# Patient Record
Sex: FEMALE
[De-identification: names, ages, dates, MRNs, and addresses within clinical notes are randomized per-mention and may not be internally consistent; named-entity substitution may affect disease eponyms.]

---

## 2021-01-13 ENCOUNTER — APPOINTMENT (OUTPATIENT)
Dept: ENDOCRINOLOGY | Facility: CLINIC | Age: 36
End: 2021-01-13
Payer: COMMERCIAL

## 2021-01-13 VITALS
BODY MASS INDEX: 21.05 KG/M2 | DIASTOLIC BLOOD PRESSURE: 72 MMHG | HEIGHT: 66 IN | SYSTOLIC BLOOD PRESSURE: 131 MMHG | HEART RATE: 79 BPM | WEIGHT: 131 LBS

## 2021-01-13 DIAGNOSIS — Z78.9 OTHER SPECIFIED HEALTH STATUS: ICD-10-CM

## 2021-01-13 PROCEDURE — 99204 OFFICE O/P NEW MOD 45 MIN: CPT

## 2021-01-13 PROCEDURE — 99072 ADDL SUPL MATRL&STAF TM PHE: CPT

## 2021-01-15 PROBLEM — Z78.9 NON-SMOKER: Status: ACTIVE | Noted: 2021-01-15

## 2021-01-15 NOTE — HISTORY OF PRESENT ILLNESS
[FreeTextEntry1] : 34 y/o F w/ Hx of MALICK\par here for initial evaluation and management of hair loss\par generally feels well and endorses no acute complaints.\par reports androgenic pattern hair loss, worst over the last 5 years. no clear trigger, reports acne but denies hirsutism. reports periods are regular, coming every 4 weeks, not on any JOSEPH. no past use. states she has used biotin recently. only med are iron pills for her MALICK, being evaluated by HEmatology. She otherwise denies any f/c, CP, SOB, palpitations, tremors, depressed mood, anxiety, palpitations, n/v, stool/urinary abn, skin/weight changes, heat/cold intolerance, HAs, breast/nipple changes, polyuria/polydipsia/nocturia or other complaints.\par she again denies any dysphagia, hoarseness, neck tenderness or new palpable masses. she again denies any family history of thyroid disorders or personal exposure to ionizing radiation.\par Adrenarche/MEnarche ~ age 11-13, no plans for pregnancy in the near future\par

## 2021-01-15 NOTE — ASSESSMENT
[FreeTextEntry1] : Hair loss\par possible hyperandrogenism given concomitant acne. no hirsutism of note. regular periods and normal weight noted. physiololgy of hair growth reviewed. discussed hair health and avoidance of mechanical strains (reports past braiding). advised on derm eval and rogaine use. r/o endocrinopathies and nutritional/vitamin deficiencies affecting hair growth. Verbalized understanding and agrees with treatment plan, will contact MD and seek emergency medical care if condition changes

## 2021-03-19 DIAGNOSIS — Z00.00 ENCOUNTER FOR GENERAL ADULT MEDICAL EXAMINATION W/OUT ABNORMAL FINDINGS: ICD-10-CM

## 2021-03-29 ENCOUNTER — TRANSCRIPTION ENCOUNTER (OUTPATIENT)
Age: 36
End: 2021-03-29

## 2021-03-31 ENCOUNTER — RESULT REVIEW (OUTPATIENT)
Age: 36
End: 2021-03-31

## 2021-03-31 ENCOUNTER — OUTPATIENT (OUTPATIENT)
Dept: OUTPATIENT SERVICES | Facility: HOSPITAL | Age: 36
LOS: 1 days | End: 2021-03-31

## 2021-03-31 ENCOUNTER — APPOINTMENT (OUTPATIENT)
Dept: MRI IMAGING | Facility: CLINIC | Age: 36
End: 2021-03-31
Payer: COMMERCIAL

## 2021-03-31 PROCEDURE — 70553 MRI BRAIN STEM W/O & W/DYE: CPT | Mod: 26

## 2021-04-14 ENCOUNTER — APPOINTMENT (OUTPATIENT)
Dept: ENDOCRINOLOGY | Facility: CLINIC | Age: 36
End: 2021-04-14
Payer: COMMERCIAL

## 2021-04-14 VITALS
BODY MASS INDEX: 21.31 KG/M2 | DIASTOLIC BLOOD PRESSURE: 70 MMHG | SYSTOLIC BLOOD PRESSURE: 107 MMHG | HEART RATE: 70 BPM | WEIGHT: 132 LBS

## 2021-04-14 PROCEDURE — 99072 ADDL SUPL MATRL&STAF TM PHE: CPT

## 2021-04-14 PROCEDURE — 99215 OFFICE O/P EST HI 40 MIN: CPT

## 2021-04-16 NOTE — HISTORY OF PRESENT ILLNESS
[FreeTextEntry1] : 36 y/o F w/ Hx of MALICK\par initial evaluation and management of hair loss\par generally feels well and endorses no acute complaints.\par reports androgenic pattern hair loss, worst over the last 5 years. no clear trigger, reports acne but denies hirsutism. reports periods are regular, coming every 4 weeks, not on any JOSEPH. no past use. states she has used biotin recently. only med are iron pills for her MALICK, being evaluated by HEmatology. \par \par \par 4/2021 Here for /fu, generally feels well and endorses no acute complaints. No interval events since LV. Today comes for f/u of labs and MRI, which confirm hyperprolactinemia, MRI of the sellar does not show definitive pathology other than mild lateral enlargement w/o a discrete mass or mass effect/displacement.\par She otherwise denies any f/c, CP, SOB, palpitations, tremors, depressed mood, anxiety, palpitations, n/v, stool/urinary abn, skin/weight changes, heat/cold intolerance, HAs, breast/nipple changes, polyuria/polydipsia/nocturia or other complaints.\par she again denies any dysphagia, hoarseness, neck tenderness or new palpable masses. she again denies any family history of thyroid disorders or personal exposure to ionizing radiation.\par Adrenarche/MEnarche ~ age 12-13, no plans for pregnancy in the near future\par

## 2021-04-16 NOTE — ASSESSMENT
[FreeTextEntry1] : Hair loss\par possible hyperandrogenism given concomitant acne. no hirsutism of note. regular periods and normal weight noted. physiololgy of hair growth reviewed. discussed hair health and avoidance of mechanical strains (reports past braiding). advised on derm eval and rogaine use. hyperprolactinemia w/ mild testosterone elevation may be playing a role, proceed w/ DA iniitation ( cabergoline 0.25 mg twice weekly). nutritional/vitamin deficiencies affecting hair growth reviewed. Verbalized understanding and agrees with treatment plan, will contact MD and seek emergency medical care if condition changes

## 2021-06-29 ENCOUNTER — APPOINTMENT (OUTPATIENT)
Dept: ENDOCRINOLOGY | Facility: CLINIC | Age: 36
End: 2021-06-29
Payer: COMMERCIAL

## 2021-06-29 PROCEDURE — 99214 OFFICE O/P EST MOD 30 MIN: CPT | Mod: 95

## 2021-07-01 NOTE — ASSESSMENT
[FreeTextEntry1] : Hair loss\par possible hyperandrogenism given concomitant acne. no hirsutism of note. regular periods and normal weight noted. physiololgy of hair growth reviewed. discussed hair health and avoidance of mechanical strains (reports past braiding). advised on derm eval and rogaine use. hyperprolactinemia w/ mild testosterone elevation may be playing a role, proceed w/ DA titration( cabergoline 0.25 mg twice weekly). \par MRI w/o discrete adenoma or mass effect. no s/s of intracranial mass effect\par nutritional/vitamin deficiencies affecting hair growth reviewed. Verbalized understanding and agrees with treatment plan, will contact MD and seek emergency medical care if condition changes

## 2021-07-01 NOTE — HISTORY OF PRESENT ILLNESS
[Home] : at home, [unfilled] , at the time of the visit. [Medical Office: ([facility])___] : at the medical office located in  [Verbal consent obtained from patient] : the patient, [unfilled] [FreeTextEntry1] : 37 y/o F w/ Hx of MALICK\par initial evaluation and management of hair loss\par generally feels well and endorses no acute complaints.\par reports androgenic pattern hair loss, worst over the last 5 years. no clear trigger, reports acne but denies hirsutism. reports periods are regular, coming every 4 weeks, not on any JOSEPH. no past use. states she has used biotin recently. only med are iron pills for her MALICK, being evaluated by HEmatology. \par \par \par 4/2021 Here for /fu, generally feels well and endorses no acute complaints. No interval events since LV. Today comes for f/u of labs and MRI, which confirm hyperprolactinemia, MRI of the sellar does not show definitive pathology other than mild lateral enlargement w/o a discrete mass or mass effect/displacement.\par She otherwise denies any f/c, CP, SOB, palpitations, tremors, depressed mood, anxiety, palpitations, n/v, stool/urinary abn, skin/weight changes, heat/cold intolerance, HAs, breast/nipple changes, polyuria/polydipsia/nocturia or other complaints.\par she again denies any dysphagia, hoarseness, neck tenderness or new palpable masses. she again denies any family history of thyroid disorders or personal exposure to ionizing radiation.\par Adrenarche/MEnarche ~ age 11-13, no plans for pregnancy in the near future\par

## 2021-10-05 ENCOUNTER — APPOINTMENT (OUTPATIENT)
Dept: ENDOCRINOLOGY | Facility: CLINIC | Age: 36
End: 2021-10-05
Payer: COMMERCIAL

## 2021-10-05 VITALS
HEART RATE: 67 BPM | BODY MASS INDEX: 22.11 KG/M2 | WEIGHT: 137 LBS | DIASTOLIC BLOOD PRESSURE: 64 MMHG | SYSTOLIC BLOOD PRESSURE: 109 MMHG

## 2021-10-05 PROCEDURE — 99214 OFFICE O/P EST MOD 30 MIN: CPT

## 2021-10-06 NOTE — HISTORY OF PRESENT ILLNESS
[FreeTextEntry1] : 37 y/o F w/ Hx of MALICK\par initial evaluation and management of hair loss\par generally feels well and endorses no acute complaints.\par reports androgenic pattern hair loss, worst over the last 5 years. no clear trigger, reports acne but denies hirsutism. reports periods are regular, coming every 4 weeks, not on any JOSEPH. no past use. states she has used biotin recently. only med are iron pills for her MALICK, being evaluated by HEmatology. \par \par \par 10/2021 Here for /fu, generally feels well and endorses no acute complaints. No interval events since LV. Today comes for f/u of labs and MRI, which confirm hyperprolactinemia, MRI of the sellar does not show definitive pathology other than mild lateral enlargement w/o a discrete mass or mass effect/displacement. reports full tolerance and compliance w/ cabergoline as instructed, w/ some partial improvement in hair growth.\par She otherwise denies any f/c, CP, SOB, palpitations, tremors, depressed mood, anxiety, palpitations, n/v, stool/urinary abn, skin/weight changes, heat/cold intolerance, HAs, breast/nipple changes, polyuria/polydipsia/nocturia or other complaints.\par she again denies any dysphagia, hoarseness, neck tenderness or new palpable masses. she again denies any family history of thyroid disorders or personal exposure to ionizing radiation.\par Adrenarche/MEnarche ~ age 12-13, no plans for pregnancy in the near future\par

## 2022-02-02 ENCOUNTER — APPOINTMENT (OUTPATIENT)
Dept: ENDOCRINOLOGY | Facility: CLINIC | Age: 37
End: 2022-02-02
Payer: COMMERCIAL

## 2022-02-02 VITALS
DIASTOLIC BLOOD PRESSURE: 53 MMHG | HEART RATE: 70 BPM | BODY MASS INDEX: 22.02 KG/M2 | WEIGHT: 137 LBS | SYSTOLIC BLOOD PRESSURE: 116 MMHG | HEIGHT: 66 IN

## 2022-02-02 PROCEDURE — 99214 OFFICE O/P EST MOD 30 MIN: CPT

## 2022-02-03 NOTE — HISTORY OF PRESENT ILLNESS
[FreeTextEntry1] : 37 y/o F w/ Hx of MALICK\par initial evaluation and management of hair loss\par generally feels well and endorses no acute complaints.\par reports androgenic pattern hair loss, worst over the last 5 years. no clear trigger, reports acne but denies hirsutism. reports periods are regular, coming every 4 weeks, not on any JOSEPH. no past use. states she has used biotin recently. only med are iron pills for her MALICK, being evaluated by HEmatology. \par \par \par 2/2022 Here for /fu, generally feels well and endorses no acute complaints. No interval events since LV. Today comes for f/u of labs and MRI, which confirm hyperprolactinemia, MRI of the sellar does not show definitive pathology other than mild lateral enlargement w/o a discrete mass or mass effect/displacement. reports full tolerance and compliance w/ cabergoline as instructed, w/ some partial improvement in hair growth.\par She otherwise denies any f/c, CP, SOB, palpitations, tremors, depressed mood, anxiety, palpitations, n/v, stool/urinary abn, skin/weight changes, heat/cold intolerance, HAs, breast/nipple changes, polyuria/polydipsia/nocturia or other complaints.\par she again denies any dysphagia, hoarseness, neck tenderness or new palpable masses. she again denies any family history of thyroid disorders or personal exposure to ionizing radiation.\par Adrenarche/MEnarche ~ age 12-13, no plans for pregnancy in the near future\par

## 2022-12-05 ENCOUNTER — OUTPATIENT (OUTPATIENT)
Dept: OUTPATIENT SERVICES | Facility: HOSPITAL | Age: 37
LOS: 1 days | End: 2022-12-05

## 2022-12-05 ENCOUNTER — RESULT REVIEW (OUTPATIENT)
Age: 37
End: 2022-12-05

## 2022-12-05 ENCOUNTER — APPOINTMENT (OUTPATIENT)
Dept: MRI IMAGING | Facility: CLINIC | Age: 37
End: 2022-12-05

## 2022-12-05 PROCEDURE — 70553 MRI BRAIN STEM W/O & W/DYE: CPT | Mod: 26

## 2022-12-20 ENCOUNTER — APPOINTMENT (OUTPATIENT)
Dept: ENDOCRINOLOGY | Facility: CLINIC | Age: 37
End: 2022-12-20
Payer: COMMERCIAL

## 2022-12-20 VITALS
BODY MASS INDEX: 23.24 KG/M2 | SYSTOLIC BLOOD PRESSURE: 125 MMHG | HEART RATE: 71 BPM | WEIGHT: 144 LBS | DIASTOLIC BLOOD PRESSURE: 76 MMHG

## 2022-12-20 DIAGNOSIS — L65.9 NONSCARRING HAIR LOSS, UNSPECIFIED: ICD-10-CM

## 2022-12-20 DIAGNOSIS — E28.8 OTHER OVARIAN DYSFUNCTION: ICD-10-CM

## 2022-12-20 DIAGNOSIS — E22.1 HYPERPROLACTINEMIA: ICD-10-CM

## 2022-12-20 PROCEDURE — 99214 OFFICE O/P EST MOD 30 MIN: CPT

## 2022-12-23 NOTE — HISTORY OF PRESENT ILLNESS
[FreeTextEntry1] : 37 y/o F w/ Hx of MALICK\par initial evaluation and management of hair loss\par generally feels well and endorses no acute complaints.\par reports androgenic pattern hair loss, worst over the last 5 years. no clear trigger, reports acne but denies hirsutism. reports periods are regular, coming every 4 weeks, not on any JOSEPH. no past use. states she has used biotin recently. only med are iron pills for her MALICK, being evaluated by HEmatology. \par \par \par 12/2022 Here for /fu, generally feels well and endorses no acute complaints. No interval events since LV. Today comes for f/u of labs and MRI, which confirm hyperprolactinemia, MRI of the sellar does not show definitive pathology other than mild lateral enlargement w/o a discrete mass or mass effect/displacement. reports full tolerance and compliance w/ cabergoline as instructed, w/ some partial improvement in hair growth.\par She otherwise denies any f/c, CP, SOB, palpitations, tremors, depressed mood, anxiety, palpitations, n/v, stool/urinary abn, skin/weight changes, heat/cold intolerance, HAs, breast/nipple changes, polyuria/polydipsia/nocturia or other complaints.\par she again denies any dysphagia, hoarseness, neck tenderness or new palpable masses. she again denies any family history of thyroid disorders or personal exposure to ionizing radiation.\par Adrenarche/MEnarche ~ age 12-13, no plans for pregnancy in the near future\par

## 2022-12-23 NOTE — ASSESSMENT
[FreeTextEntry1] : Hair loss\par possible hyperandrogenism given concomitant acne. no hirsutism of note. regular periods and normal weight noted. physiololgy of hair growth reviewed. discussed hair health and avoidance of mechanical strains (reports past braiding). advised on derm eval and rogaine use. hyperprolactinemia w/ mild testosterone elevation may be playing a role, proceed w/ DA titration( cabergoline 0.25 mg twice weekly). \par MRI w/o discrete adenoma or mass effect. no s/s of intracranial mass effect\par nutritional/vitamin deficiencies affecting hair growth reviewed. Verbalized understanding and agrees with treatment plan, will contact MD and seek emergency medical care if condition changes. She reports being diagnosed w/ concomitant alopecia areata, now on olumiant trial.

## 2023-07-05 RX ORDER — CABERGOLINE 0.5 MG/1
0.5 TABLET ORAL
Qty: 7 | Refills: 0 | Status: ACTIVE | COMMUNITY
Start: 2021-04-14 | End: 1900-01-01